# Patient Record
Sex: FEMALE | Race: WHITE | NOT HISPANIC OR LATINO | Employment: UNEMPLOYED | URBAN - METROPOLITAN AREA
[De-identification: names, ages, dates, MRNs, and addresses within clinical notes are randomized per-mention and may not be internally consistent; named-entity substitution may affect disease eponyms.]

---

## 2021-02-03 ENCOUNTER — OFFICE VISIT CONVERTED (OUTPATIENT)
Dept: UROLOGY | Facility: CLINIC | Age: 36
End: 2021-02-03
Attending: NURSE PRACTITIONER

## 2021-02-03 ENCOUNTER — HOSPITAL ENCOUNTER (OUTPATIENT)
Dept: SURGERY | Facility: CLINIC | Age: 36
Discharge: HOME | End: 2021-02-03
Attending: NURSE PRACTITIONER

## 2021-02-06 LAB
AMPICILLIN SUSC ISLT: <=0.25
BACTERIA UR CULT: ABNORMAL
CEFOTAXIME SUSC ISLT: <=0.12
CEFTRIAXONE SUSC ISLT: <=0.12
LEVOFLOXACIN SUSC ISLT: 0.5
PENICILLIN G SUSC ISLT: <=0.06
TETRACYCLINE SUSC ISLT: >=16
TIGECYCLINE SUSC ISLT: <=0.06
VANCOMYCIN SUSC ISLT: 0.5

## 2021-03-01 ENCOUNTER — HOSPITAL ENCOUNTER (OUTPATIENT)
Dept: OTHER | Facility: HOSPITAL | Age: 36
Discharge: HOME | End: 2021-03-01
Attending: NURSE PRACTITIONER

## 2021-03-01 LAB
APPEARANCE UR: ABNORMAL
BILIRUB UR QL: NEGATIVE
COLOR UR: YELLOW
CONV BACTERIA: NEGATIVE
CONV COLLECTION SOURCE (UA): ABNORMAL
CONV HYALINE CASTS IN URINE MICRO: ABNORMAL /[LPF]
CONV UROBILINOGEN IN URINE BY AUTOMATED TEST STRIP: 0.2 {EHRLICHU}/DL (ref 0.1–1)
GLUCOSE UR QL: NEGATIVE MG/DL
HGB UR QL STRIP: NEGATIVE
KETONES UR QL STRIP: NEGATIVE MG/DL
LEUKOCYTE ESTERASE UR QL STRIP: NEGATIVE
NITRITE UR QL STRIP: NEGATIVE
PH UR STRIP.AUTO: 5 [PH] (ref 5–8)
PROT UR QL: ABNORMAL MG/DL
RBC #/AREA URNS HPF: ABNORMAL /[HPF]
SP GR UR: 1.03 (ref 1–1.03)
WBC #/AREA URNS HPF: ABNORMAL /[HPF]

## 2021-03-03 LAB — BACTERIA UR CULT: NORMAL

## 2021-05-10 NOTE — H&P
History and Physical      Patient Name: Elizabet Cooley   Patient ID: [id number]   Sex: Female   YOB: 1985    Primary Care Provider: Manan ESTRADA   Referring Provider: Nemo DIEHL    Visit Date: February 3, 2021    Provider: NATALIE Shaffer   Location: [facility] [place]   Location Address: [address] [place] [address]  [address] [place] [address]  [id number]   Location Phone: [phone]          Chief Complaint  · Urological concerns      History Of Present Illness  The patient is a 35 year old /White female, who is a consultation from Nemo DIEHL, for a persistent UTI.   Symptoms are frequency, urgency, and abdominal pain. She is moderately bothered by the symptoms. The following additional symptoms are noted: back pain, stomach pain. There are no aggravating factors. The patient is not constipated.   She has had prior treatment. Temporary relief has been obtained with antibiotics. The most recent antibiotic was taken 1 month ago. Prior evaluation has been done. This includes CT scan and the studies were normal.   She has no significant prior  problems.      The patient was seen at an urgent care center in September 2020 and states that she was diagnosed at that point in time with bacterial vaginosis as well urinary tract infection.    Patient states that she was given Augmentin and Flagyl for treatment of her urinary tract infection as well was bacterial vaginosis.    Per her OB/GYN's record the patient has had a positive culture for group B strep on 9/1/2020, 10/8/2020 and 10/29/2020 review of these urine cultures revealed that on 9/1/2020 the patient had beta-hemolytic Streptococcus group B greater than 100,000 colony-forming units per milliliter.  She was treated with an antibiotic at that point in time and then presented to her OB/GYN and placed on cefdinir twice daily x10 days for a urine culture that was positive for 1000  "colonies per milliliter of beta-hemolytic Streptococcus group B.  After that point in time the patient reports that she has had colitis which resulted in her having blood in her stools and abdominal pain.  She presented to her primary care provider on 10/29/2020 and her urine culture was again positive for beta-hemolytic Streptococcus 1000 colonies per milliliter for which her primary care provider did not treat.    Per the patient's OB/GYN notes she does have frequent issues with vulvovaginitis.    Primary care provider ordered a CT abdomen and wrist with IV which revealed no acute findings    The patient states that in October prior to presenting to her primary care provider's office she a \"wave of pain that felt like contractions \"her urine culture came back negative after this time the patient was not treated for an acute infection    large blood on urine dip today however she does report being on her menses    The patient states that she does not have penetrative sexual intercourse however the patient and her partner often use their hands for vaginal stimulation.  The patient also states that her partner uses oral fluids as vaginal lubricant during this point in time.    patient drinks 1-2 bottles of water a day       Past Medical History  Allergic rhinitis, chronic; Anemia, Unspecified; Colitis; GERD; Proctitis; Rectal bleeding         Past Surgical History  Cesarian Section; Colonoscopy         Medication List  Acidophilus oral capsule; folic acid 1 mg oral tablet; Probiotic 15 billion cell oral capsule         Allergy List  erythromycin; SULFA (SULFONAMIDES)       Allergies Reconciled  Family Medical History  Diabetes, unspecified type; Family history of colon cancer         Social History  Alcohol (Current some day); Caffeine (Current some day); Illicit drug use (Never); Tobacco (Former)         Review of Systems  · Constitutional  o Denies  o : fever, chills  · Cardiovascular  o Denies  o : chest pain at " "rest, chest pain with exercise, irregular heart beats, palpitations, leg cramps with exercise  · Respiratory  o Denies  o : shortness of breath, wheezing, sleep apnea  · Gastrointestinal  o Denies  o : heartburn or indigestion, nausea or vomiting, change in abdominal girth, diarrhea, constipation, blood in stools  · Genitourinary  o Admits  o : additional symptoms as noted in HPI  · Integument  o Denies  o : rash, new skin lesions  · Neurologic  o Denies  o : memory difficulties, headache, mini-strokes, seizures  · Endocrine  o Denies  o : hot flashes, thyroid disorders  · Heme-Lymph  o Denies  o : easy bleeding, easy bruising, sickle cell disease or trait, lymph node enlargement or tenderness  · Allergic-Immunologic  o Denies  o : immune deficiency, HIV, Hepatitis C      Vitals  Date Time BP Position Site L\R Cuff Size HR RR TEMP (F) WT  HT  BMI kg/m2 BSA m2 O2 Sat FR L/min FiO2 HC       02/03/2021 08:49 AM       16 97.4 184lbs 2oz 5'  5\" 30.64 1.96             Physical Examination  · Constitutional  o Appearance  o : Well nourished, well developed patient in no acute distress. Ambulating without difficulty.  · Head and Face  o Head  o :   § Inspection  § : atraumatic, normocephalic  o Face  o :   § Inspection  § : no facial lesions  · Eyes  o Sclerae  o : sclerae white  · Ears, Nose, Mouth and Throat  o Ears  o :   § External Ears  § : appearance within normal limits, no lesions present  o Nose  o :   § External Nose  § : appearance normal  · Neck  o Inspection/Palpation  o : normal appearance, trachea midline  · Respiratory  o Respiratory Effort  o : Breathing is unlabored without accessory muscle use  o Inspection of Chest  o : normal appearance, no retractions  · Musculoskeletal  o Pelvis  o : no pelvic bony or muscular tenderness  o Ribs  o : no deformities or tenderness to palpation present, costochondral junctions nontender to palpation  · Skin and Subcutaneous Tissue  o General Inspection  o : No rashes, " lesions or areas of discoloration present. Skin turgor is normal.  · Neurologic  o Mental Status Examination  o :   § Orientation  § : grossly oriented to person, place and time  § Speech/Language  § : communication ability within normal limits  o Gait and Station  o : normal gait, able to stand without difficulty  · Psychiatric  o Judgement and Insight  o : judgment and insight intact, judgement for everyday activities and social situations within normal limits, insight intact  o Mood and Affect  o : mood normal, affect appropriate          Results  · In-Office Procedures  o Surgical procedure  § IOP - Bladder Scan/Residual Urine (05889)   § Specimen vol Ur: 000   o Lab procedure  § Automated dipstick urinalysis with microscopy (06472)   § Color Ur: Yellow   § Clarity Ur: Clear   § Glucose Ur Ql Strip: Negative   § Bilirub Ur Ql Strip: Negative   § Ketones Ur Ql Strip: Negative   § Sp Gr Ur Qn: greater than 1.030   § Hgb Ur Ql Strip: Large   § pH Ur-LsCnc: 5.5   § Prot Ur Ql Strip: Negative   § Urobilinogen Ur Strip-mCnc: 0.2 E.U./dL   § Nitrite Ur Ql Strip: Negative   § WBC Est Ur Ql Strip: Negative   § RBC UrnS Qn HPF: 0-3   § WBC UrnS Qn HPF: 0   § Bacteria UrnS Qn HPF: few   § Crystals UrnS Qn HPF: 0   § Epithelial Cells (non renal): 0 /HPF  § Epithelial Cells (renal): 0       Assessment  · Cystitis     595.9/N30.90  · Suprapubic pain     789.09/R10.2      Plan  · Orders  o Urine Culture (Clean Catch) [facility] ([id number]) - 595.9/N30.90 - 02/03/2021  · Medications  o Medications have been Reconciled  o Transition of Care or Provider Policy  · Instructions  o DISCUSSION: Discussed with patient that I do not believe that her symptoms are urinary in etiology. Also discussed with patient that it does appear that her OB/GYN has been treating her for subclinical urinary tract infections and that she may have colonized the beta group be strep in her urinary system. I do not feel as if the patient warranted further antibiotic  treatment for her urine cultures after the initial treatment. Discussed with patient the AUA guidelines for treatment of urinary tract infections and that given her urine cultures have less than 50,000 colony-forming units per milliliter and her urinalysis does not reveal pyuria she did not require treatment. I did discuss with the patient that I believe her abdominal pains are GI in nature especially given her history of colitis and negative urologic findings on CT scan. After review of the patient's chart leave that her burning with urination more than likely is related to vulvovaginitis and not a urologic issue.  o PLAN: Discussed with the patient that she will need to follow-up with me solely diagnosis and treatment of her further urinary tract infections as it does not appear to be benefiting her to bounce back and forth from her OB/GYN to her primary care provider this issue. Patient is to call the office with any signs or symptoms of urinary tract infection. We will follow-up with her in office in 3 months  o Urine culture  o Void after intercourse and wipe front to back when using the restroom  o Avoid bladder irritants  o Timed voiding q 3-4 hours  o Double voiding  o Avoid tub baths, tampons, spermicides, vaginal douching, IUD use  o Encourage intake of yogurt and cranberry tablets/juice  o The patient is advised to call for any signs or symptoms of a UTI.  o Electronically Identified Patient Education Materials Provided Electronically            Electronically Signed by: NATALIE Shaffer -Author on February 3, 2021 10:15:48 PM

## 2021-05-14 VITALS — BODY MASS INDEX: 30.68 KG/M2 | HEIGHT: 65 IN | WEIGHT: 184.12 LBS | RESPIRATION RATE: 16 BRPM | TEMPERATURE: 97.4 F

## 2021-05-24 ENCOUNTER — OFFICE VISIT CONVERTED (OUTPATIENT)
Dept: UROLOGY | Facility: CLINIC | Age: 36
End: 2021-05-24
Attending: NURSE PRACTITIONER

## 2021-05-24 ENCOUNTER — CONVERSION ENCOUNTER (OUTPATIENT)
Dept: SURGERY | Facility: CLINIC | Age: 36
End: 2021-05-24

## 2021-05-24 ENCOUNTER — HOSPITAL ENCOUNTER (OUTPATIENT)
Dept: SURGERY | Facility: CLINIC | Age: 36
Discharge: HOME | End: 2021-05-24
Attending: NURSE PRACTITIONER

## 2021-05-24 LAB
BILIRUB UR QL STRIP: NEGATIVE
COLOR UR: YELLOW
CONV BACTERIA IN URINE MICRO: NORMAL
CONV CALCIUM OXALATE CRYSTALS /HPF IN URINE SEDIMENT BY MICROSCOPY: 0
CONV CLARITY OF URINE: CLEAR
CONV PROTEIN IN URINE BY AUTOMATED TEST STRIP: NEGATIVE
CONV UROBILINOGEN IN URINE BY AUTOMATED TEST STRIP: NORMAL
GLUCOSE UR QL: NEGATIVE
HGB UR QL STRIP: NEGATIVE
KETONES UR QL STRIP: NEGATIVE
LEUKOCYTE ESTERASE UR QL STRIP: NEGATIVE
NITRITE UR QL STRIP: NEGATIVE
PH UR STRIP.AUTO: 5.5 [PH]
RBC #/AREA URNS HPF: 0 /[HPF]
RENAL EPI CELLS #/AREA URNS HPF: 0 /[HPF]
SP GR UR: 1.03
SQUAMOUS SPT QL MICRO: NORMAL
WBC #/AREA URNS HPF: 0 /[HPF]

## 2021-05-26 LAB — BACTERIA UR CULT: NORMAL

## 2021-06-05 NOTE — PROGRESS NOTES
Progress Note      Patient Name: Elizabet Cooley   Patient ID: [id number]   Sex: Female   YOB: 1985    Primary Care Provider: Manan ESTRADA   Referring Provider: Nemo DIEHL    Visit Date: May 24, 2021    Provider: NATALIE Shaffer   Location: [facility] [place]   Location Address: [address] [place] [address]  [address] [place] [address]  [id number]   Location Phone: [phone]          Chief Complaint  · pt here for urological concerns  · Urological concerns      History Of Present Illness  The patient is a 35 year old /White female , returns for f/u on recurrent UTI.        States that she has not had a urinary tract infection since she saw us last however she states that she does feel like she might be becoming symptomatic today given her increased urgency and frequency and some mild suprapubic pain.    Her urine dip is unremarkable in office today however she does have a significant amount of bacteria on urine microscopy.    she reports that she is increasing her water intake but doing so she states that she is having issues with increased urgency and frequency as well as urinary leakage.      Previous:  The patient was seen at an urgent care center in September 2020 and states that she was diagnosed at that point in time with bacterial vaginosis as well urinary tract infection.    Patient states that she was given Augmentin and Flagyl for treatment of her urinary tract infection as well was bacterial vaginosis.    Per her OB/GYN's record the patient has had a positive culture for group B strep on 9/1/2020, 10/8/2020 and 10/29/2020 review of these urine cultures revealed that on 9/1/2020 the patient had beta-hemolytic Streptococcus group B greater than 100,000 colony-forming units per milliliter.  She was treated with an antibiotic at that point in time and then presented to her OB/GYN and placed on cefdinir twice daily x10 days for a urine  "culture that was positive for 1000 colonies per milliliter of beta-hemolytic Streptococcus group B.  After that point in time the patient reports that she has had colitis which resulted in her having blood in her stools and abdominal pain.  She presented to her primary care provider on 10/29/2020 and her urine culture was again positive for beta-hemolytic Streptococcus 1000 colonies per milliliter for which her primary care provider did not treat.    Per the patient's OB/GYN notes she does have frequent issues with vulvovaginitis.    Primary care provider ordered a CT abdomen and wrist with IV which revealed no acute findings    The patient states that in October prior to presenting to her primary care provider's office she a \"wave of pain that felt like contractions \"her urine culture came back negative after this time the patient was not treated for an acute infection    large blood on urine dip today however she does report being on her menses    The patient states that she does not have penetrative sexual intercourse however the patient and her partner often use their hands for vaginal stimulation.  The patient also states that her partner uses oral fluids as vaginal lubricant during this point in time.    patient drinks 1-2 bottles of water a day       Past Medical History  Allergic rhinitis, chronic; Anemia, Unspecified; Colitis; GERD; Proctitis; Rectal bleeding         Past Surgical History  Cesarian Section; Colonoscopy         Medication List  Acidophilus oral capsule; folic acid 1 mg oral tablet         Allergy List  erythromycin; SULFA (SULFONAMIDES)       Allergies Reconciled  Family Medical History  Diabetes, unspecified type; Family history of colon cancer         Social History  Alcohol (Current some day); Caffeine (Current some day); Illicit drug use (Never); Tobacco (Former)         Review of Systems  · Constitutional  o Denies  o : fever, chills  · Cardiovascular  o Denies  o : chest pain at rest, " "chest pain with exercise, irregular heart beats, palpitations, leg cramps with exercise  · Respiratory  o Denies  o : shortness of breath, wheezing, sleep apnea  · Gastrointestinal  o Denies  o : heartburn or indigestion, nausea or vomiting, change in abdominal girth, diarrhea, constipation, blood in stools  · Genitourinary  o Admits  o : additional symptoms as noted in HPI  · Integument  o Denies  o : rash, new skin lesions  · Neurologic  o Denies  o : memory difficulties, headache, mini-strokes, seizures  · Endocrine  o Denies  o : hot flashes, thyroid disorders  · Heme-Lymph  o Denies  o : easy bleeding, easy bruising, sickle cell disease or trait, lymph node enlargement or tenderness  · Allergic-Immunologic  o Denies  o : immune deficiency, HIV, Hepatitis C      Vitals  Date Time BP Position Site L\R Cuff Size HR RR TEMP (F) WT  HT  BMI kg/m2 BSA m2 O2 Sat FR L/min FiO2 HC       05/24/2021 12:00 PM       17  195lbs 4oz 5'  5\" 32.49 2.02             Physical Examination  · Constitutional  o Appearance  o : Well nourished, well developed patient in no acute distress. Ambulating without difficulty.  · Head and Face  o Head  o :   § Inspection  § : atraumatic, normocephalic  o Face  o :   § Inspection  § : no facial lesions  · Eyes  o Sclerae  o : sclerae white  · Ears, Nose, Mouth and Throat  o Ears  o :   § External Ears  § : appearance within normal limits, no lesions present  o Nose  o :   § External Nose  § : appearance normal  · Neck  o Inspection/Palpation  o : normal appearance, trachea midline  · Respiratory  o Respiratory Effort  o : Breathing is unlabored without accessory muscle use  o Inspection of Chest  o : normal appearance, no retractions  · Musculoskeletal  o Pelvis  o : no pelvic bony or muscular tenderness  o Ribs  o : no deformities or tenderness to palpation present, costochondral junctions nontender to palpation  · Skin and Subcutaneous Tissue  o General Inspection  o : No rashes, lesions or " areas of discoloration present. Skin turgor is normal.  · Neurologic  o Mental Status Examination  o :   § Orientation  § : grossly oriented to person, place and time  § Speech/Language  § : communication ability within normal limits  o Gait and Station  o : normal gait, able to stand without difficulty  · Psychiatric  o Judgement and Insight  o : judgment and insight intact, judgement for everyday activities and social situations within normal limits, insight intact  o Mood and Affect  o : mood normal, affect appropriate          Results  · In-Office Procedures  o Lab procedure  § Automated dipstick urinalysis with microscopy ([id number])   § Color Ur: Yellow   § Clarity Ur: Clear   § Glucose Ur Ql Strip: Negative   § Bilirub Ur Ql Strip: Negative   § Ketones Ur Ql Strip: Negative   § Sp Gr Ur Qn: 1.030   § Hgb Ur Ql Strip: Negative   § pH Ur-LsCnc: 5.5   § Prot Ur Ql Strip: Negative   § Urobilinogen Ur Strip-mCnc: 0.2 E.U./dL   § Nitrite Ur Ql Strip: Negative   § WBC Est Ur Ql Strip: Negative   § RBC UrnS Qn HPF: 0   § WBC UrnS Qn HPF: 0   § Bacteria UrnS Qn HPF: tntc   § Crystals UrnS Qn HPF: 0   § Epithelial Cells (non renal): 1-2 /HPF  § Epithelial Cells (renal): 0       Assessment  · Cystitis     595.9/N30.90  · Suprapubic pain     789.09/R10.2      Plan  · Orders  o Urine Culture (Clean Catch) [facility] ([id number]) - 595.9/N30.90, 789.09/R10.2 - 05/24/2021  · Medications  o Medications have been Reconciled  o Transition of Care or Provider Policy  · Instructions  o DISCUSSION: Discussed with the patient that when increasing oral fluid intake it can take up to 4 to 6 weeks for your body to adjust to a new level of hydration and resolve urgency and frequency associated with that. Discussed options for treatment including short-term anticholinergic medication however given the fact the patient is already constipated she would like to forego any medication for this at this point in time. We discussed double voiding as well as  timed voiding and Kegel exercises to help alleviate her urinary urgency and frequency as well as urinary leakage. She was provided with a bladder matters book.   o PLAN: We will send patient's urine for culture today and call her with those results. We will follow-up with her in 4 months or sooner if needed. Discussed with the patient that should she wish to trial anticholinergic medication she will call the office and we will send this in.  o Urine culture  o Void after intercourse and wipe front to back when using the restroom  o Avoid bladder irritants  o Timed voiding q 3-4 hours  o Double voiding  o Avoid tub baths, tampons, spermicides, vaginal douching, IUD use  o Encourage intake of yogurt and cranberry tablets/juice  o The patient is advised to call for any signs or symptoms of a UTI.  o Electronically Identified Patient Education Materials Provided Electronically            Electronically Signed by: NATALIE Shaffer -Author on May 24, 2021 01:09:58 PM

## 2021-07-15 VITALS — RESPIRATION RATE: 17 BRPM | HEIGHT: 65 IN | BODY MASS INDEX: 32.53 KG/M2 | WEIGHT: 195.25 LBS

## 2021-09-27 ENCOUNTER — TELEPHONE (OUTPATIENT)
Dept: SURGERY | Facility: CLINIC | Age: 36
End: 2021-09-27

## 2021-09-27 ENCOUNTER — OFFICE VISIT (OUTPATIENT)
Dept: UROLOGY | Facility: CLINIC | Age: 36
End: 2021-09-27

## 2021-09-27 VITALS — WEIGHT: 194.4 LBS | RESPIRATION RATE: 12 BRPM | HEIGHT: 65 IN | BODY MASS INDEX: 32.39 KG/M2

## 2021-09-27 DIAGNOSIS — R35.0 FREQUENCY OF URINATION: Primary | ICD-10-CM

## 2021-09-27 PROBLEM — K62.89 PROCTITIS: Status: ACTIVE | Noted: 2021-09-27

## 2021-09-27 PROBLEM — D64.9 ANEMIA: Status: ACTIVE | Noted: 2021-09-27

## 2021-09-27 PROBLEM — K21.9 ESOPHAGEAL REFLUX: Status: ACTIVE | Noted: 2021-09-27

## 2021-09-27 PROBLEM — J30.9 ALLERGIC RHINITIS: Status: ACTIVE | Noted: 2021-09-27

## 2021-09-27 PROBLEM — K52.3 INDETERMINATE COLITIS: Status: ACTIVE | Noted: 2021-09-27

## 2021-09-27 LAB
BILIRUB BLD-MCNC: NEGATIVE MG/DL
CLARITY, POC: CLEAR
COLOR UR: YELLOW
GLUCOSE UR STRIP-MCNC: NEGATIVE MG/DL
KETONES UR QL: NEGATIVE
LEUKOCYTE EST, POC: NEGATIVE
NITRITE UR-MCNC: NEGATIVE MG/ML
PH UR: 5 [PH] (ref 5–8)
PROT UR STRIP-MCNC: NEGATIVE MG/DL
RBC # UR STRIP: NEGATIVE /UL
SP GR UR: 1.03 (ref 1–1.03)
UROBILINOGEN UR QL: NORMAL

## 2021-09-27 PROCEDURE — 99212 OFFICE O/P EST SF 10 MIN: CPT | Performed by: NURSE PRACTITIONER

## 2021-09-27 RX ORDER — CITALOPRAM 20 MG/1
20 TABLET ORAL DAILY
COMMUNITY
End: 2023-04-03

## 2021-09-27 RX ORDER — OMEPRAZOLE 40 MG/1
CAPSULE, DELAYED RELEASE ORAL
COMMUNITY
End: 2023-04-03

## 2021-09-27 RX ORDER — FOLIC ACID 1 MG/1
TABLET ORAL
COMMUNITY
Start: 2021-09-06 | End: 2023-04-03

## 2021-09-27 RX ORDER — SERTRALINE HYDROCHLORIDE 25 MG/1
TABLET, FILM COATED ORAL
COMMUNITY
Start: 2021-09-16 | End: 2023-04-03

## 2021-09-27 RX ORDER — NYSTATIN 100000 U/G
OINTMENT TOPICAL
COMMUNITY
Start: 2021-09-16 | End: 2023-04-03

## 2021-09-27 RX ORDER — IBUPROFEN 400 MG/1
TABLET ORAL
COMMUNITY

## 2021-09-27 RX ORDER — GLIMEPIRIDE 2 MG/1
TABLET ORAL
COMMUNITY
Start: 2021-06-29 | End: 2023-04-03

## 2021-09-27 RX ORDER — LACTOBACILLUS ACIDOPHILUS 20B CELL
1 CAPSULE ORAL DAILY
Qty: 90 CAPSULE | Refills: 3 | Status: SHIPPED | OUTPATIENT
Start: 2021-09-27 | End: 2023-04-03

## 2021-09-27 RX ORDER — FLUCONAZOLE 100 MG/1
TABLET ORAL
COMMUNITY
Start: 2021-07-07

## 2021-09-27 NOTE — TELEPHONE ENCOUNTER
I am not sure who recommended a probiotic for her.  I can attempt to send it in but most of the time insurance companies will not cover that. We can try alcides [facility]dy and we can place a referral and give her that info.

## 2021-09-27 NOTE — PROGRESS NOTES
"Chief Complaint: Cystitis (Pt here is for follow up.  She is doing worse.  Says she has decreased fluied intake, bcause has to urinate immeditaley)    Subjective         History of Present Illness  Elizabet Cooley is a 36 y.o. female presents to [facility] to be seen for follow-up cystitis.    Patient states that she has decreased her fluid intake due to the \"urge to urinate immediately\" after drinking.    Patient states she feels as if \"she is dehydrating herself\". She has been drinking less and states the Kegel exercises have failed to help her leakage.    We had discussed at last visit the possibility of utilizing oxybutynin to gain better control of her urinary urgency and frequency however patient did not wish to go forward with that treatment modality given that she was with constipation issues already.    She states as if she has lower back pain that has been traveling up to her mid back and shoulders and she believed she had a UTI.         Objective     Past Medical History:   Diagnosis Date   • Anemia, unspecified    • Chronic allergic rhinitis    • Colitis    • GERD (gastroesophageal reflux disease)    • Proctitis    • Rectal bleeding        Past Surgical History:   Procedure Laterality Date   •  SECTION     • COLONOSCOPY           Current Outpatient Medications:   •  fluconazole (DIFLUCAN) 100 MG tablet, , Disp: , Rfl:   •  folic acid (FOLVITE) 1 MG tablet, , Disp: , Rfl:   •  ibuprofen (ADVIL,MOTRIN) 400 MG tablet, , Disp: , Rfl:   •  citalopram (CeleXA) 20 MG tablet, Take 20 mg by mouth Daily., Disp: , Rfl:   •  Lactobacillus (ACIDOPHILUS PO), Acidophilus oral capsule take 1 capsule by oral route daily   Active, Disp: , Rfl:   •  nystatin (MYCOSTATIN) 989898 UNIT/GM ointment, , Disp: , Rfl:   •  omeprazole (priLOSEC) 40 MG capsule, , Disp: , Rfl:   •  sertraline (ZOLOFT) 25 MG tablet, , Disp: , Rfl:   •  timolol (TIMOPTIC) 0.25 % ophthalmic solution, , Disp: , " "Rfl:     Allergies   Allergen Reactions   • Erythromycin Unknown - Low Severity   • Sulfa Antibiotics Unknown - Low Severity        Family History   Problem Relation Age of Onset   • Colon cancer Mother         40s   • Diabetes Father    • Diabetes Sister    • Diabetes Maternal Grandmother    • Diabetes Paternal Grandfather    • Diabetes Paternal Aunt    • Diabetes Paternal Uncle        Social History     Socioeconomic History   • Marital status: Single     Spouse name: Not on file   • Number of children: Not on file   • Years of education: Not on file   • Highest education level: Not on file   Tobacco Use   • Smoking status: Former Smoker     Start date: 2001   • Smokeless tobacco: Never Used   Substance and Sexual Activity   • Alcohol use: Yes     Comment: drinks rarely   • Drug use: Never       Vital Signs:   Resp 12   Ht 165.1 cm (65\")   Wt 88.2 kg (194 lb 6.4 oz)   BMI 32.35 kg/m²      Physical Exam     Result Review :   The following data was reviewed by: NATALIE Malloy on 09/27/2021:  Results for orders placed or performed in visit on 09/27/21   POC Urinalysis Dipstick, Automated    Specimen: Urine   Result Value Ref Range    Color Yellow Yellow, Straw, Dark Yellow, Estee    Clarity, UA Clear Clear    Specific Gravity  1.030 1.005 - 1.030    pH, Urine 5.0 5.0 - 8.0    Leukocytes Negative Negative    Nitrite, UA Negative Negative    Protein, POC Negative Negative mg/dL    Glucose, UA Negative Negative, 1000 mg/dL (3+) mg/dL    Ketones, UA Negative Negative    Urobilinogen, UA Normal Normal    Bilirubin Negative Negative    Blood, UA Negative Negative            Procedures        Assessment and Plan    Diagnoses and all orders for this visit:    1. Frequency of urination (Primary)  -     POC Urinalysis Dipstick, Automated  -     Ambulatory Referral to Physical Therapy Pelvic Floor    Discussed with patient options for treatment including medication therapy or pelvic floor physical therapy.  Patient " "states that she is not interested in medication as she would \"probably not take it\" as she is fearful to start new medicines.  Patient ultimately would like to trial pelvic floor physical therapy and see if this helps with her urinary leakage as well as urinary urgency and frequency.      I spent 10 minutes caring for Elizabet on this date of service. This time includes time spent by me in the following activities:reviewing tests, obtaining and/or reviewing a separately obtained history, performing a medically appropriate examination and/or evaluation , counseling and educating the patient/family/caregiver, ordering medications, tests, or procedures, and documenting information in the medical record  Follow Up   Return in about 4 months (around 2/9/2022) for f/u pelvic PT .  Patient was given instructions and counseling regarding her condition or for health maintenance advice. Please see specific information pulled into the AVS if appropriate.         This document has been electronically signed by NATALIE Malloy  September 27, 2021 11:17 EDT       "

## 2021-09-27 NOTE — TELEPHONE ENCOUNTER
Patient called and said that the place she was referred to does not take passport. Wants to know if there is another place in [place] to go to? Can you send in a probiotic to pt pharmacy? She cannot afford it OTC and wants to see if her insurance will pay for it. [facility] in [place]. CB# [phone]

## 2021-09-28 NOTE — TELEPHONE ENCOUNTER
Spoke to pt.  Explained in detail the message from [facility].  Pt doesn't want to see Romana Gill.  Pt will keep appt in January.

## 2021-10-27 ENCOUNTER — TELEPHONE (OUTPATIENT)
Dept: UROLOGY | Facility: CLINIC | Age: 36
End: 2021-10-27

## 2021-12-06 DIAGNOSIS — R35.0 FREQUENCY OF URINATION: Primary | ICD-10-CM

## 2022-01-03 ENCOUNTER — TELEPHONE (OUTPATIENT)
Dept: UROLOGY | Facility: CLINIC | Age: 37
End: 2022-01-03

## 2022-01-03 NOTE — TELEPHONE ENCOUNTER
Patient has been failed to reach x4 calls and no voicemail set up for patient to leave messages for her to call back. Certified letter has been sent on 12/17/2021. Patient has not called back or responded to letter. Will cancel out order for UC and will reorder if patient contacts us stating she feels as though she needs this.

## 2022-03-08 ENCOUNTER — TELEPHONE (OUTPATIENT)
Dept: UROLOGY | Facility: CLINIC | Age: 37
End: 2022-03-08

## 2022-03-08 DIAGNOSIS — R35.0 FREQUENCY OF URINATION: Primary | ICD-10-CM

## 2022-03-08 NOTE — TELEPHONE ENCOUNTER
Patient said Chelsey put in a referral for her last year for pelvic floor rehab.  [facility] in [place] wasn't taking her insurance at that time, but they are now, and she would like to go there.  Is her referral still good?

## 2022-03-09 NOTE — TELEPHONE ENCOUNTER
Called spoke with pt and she stated understanding. Also fax new referral to [facility]. Also rescheduled her apt for 3/28/22 for 4/11/22 at 130

## 2022-04-11 ENCOUNTER — TELEPHONE (OUTPATIENT)
Dept: UROLOGY | Facility: CLINIC | Age: 37
End: 2022-04-11

## 2023-03-30 ENCOUNTER — TELEPHONE (OUTPATIENT)
Dept: UROLOGY | Facility: CLINIC | Age: 38
End: 2023-03-30
Payer: COMMERCIAL

## 2023-03-30 NOTE — TELEPHONE ENCOUNTER
----- Message from Renuka Boucher sent at 3/29/2023 12:52 PM EDT -----  Originally [facility] patient, can we move her back to [facility] please

## 2023-04-02 PROBLEM — R39.15 URGENCY OF URINATION: Status: ACTIVE | Noted: 2023-04-02

## 2023-04-02 NOTE — PROGRESS NOTES
Chief Complaint: Urologic complaint    Subjective         History of Present Illness  Elizabet Cooley is a 37 y.o. female         Urgency incontinence      Patient's had urgent continence for the last few years.  5 pads daily.    Minimal stress incontinence    No GH      NP -       Urgency causing her decreased fluid intake.  Offered oxybutynin but did not go forward as she already had trouble with constipation.  UA negative  Offered pelvic floor physical therapy    History of recurrent UTI with resistant culture    No caffeine.    Urine culture     negative  3/21 negative    Possible h/o of glaucoma.      History of bacterial vaginosis     History of reflux as a child    No history of kidney  stone.    No urologic family history   no history of urologic surgery.    No cardiopulmonary history   Non-smoker.  No anticoagulation    States her eye doctor is worried about the possibility of autoimmune disorder      Results for orders placed or performed in visit on 23   POC Urinalysis Dipstick, Automated    Specimen: Urine   Result Value Ref Range    Color Yellow Yellow, Straw, Dark Yellow, Estee    Clarity, UA Clear Clear    Specific Gravity  1.030 1.005 - 1.030    pH, Urine 6.0 5.0 - 8.0    Leukocytes Negative Negative    Nitrite, UA Negative Negative    Protein, POC 2+ (A) Negative mg/dL    Glucose, UA Negative Negative mg/dL    Ketones, UA Negative Negative    Urobilinogen, UA Normal Normal, 0.2 E.U./dL    Bilirubin Negative Negative    Blood, UA Negative Negative    Lot Number [id number]     Expiration Date             Objective     Past Medical History:   Diagnosis Date   • Anemia, unspecified    • Chronic allergic rhinitis    • Colitis    • GERD (gastroesophageal reflux disease)    • Proctitis    • Rectal bleeding        Past Surgical History:   Procedure Laterality Date   •  SECTION     • COLONOSCOPY           Current Outpatient Medications:   •  citalopram (CeleXA) 20 MG tablet,  Take 20 mg by mouth Daily., Disp: , Rfl:   •  fluconazole (DIFLUCAN) 100 MG tablet, , Disp: , Rfl:   •  folic acid (FOLVITE) 1 MG tablet, , Disp: , Rfl:   •  ibuprofen (ADVIL,MOTRIN) 400 MG tablet, , Disp: , Rfl:   •  Lactobacillus (ACIDOPHILUS PO), Acidophilus oral capsule take 1 capsule by oral route daily   Active, Disp: , Rfl:   •  Lactobacillus (Florajen Acidophilus) capsule, Take 1 capsule by mouth Daily., Disp: 90 capsule, Rfl: 3  •  nystatin (MYCOSTATIN) 089500 UNIT/GM ointment, , Disp: , Rfl:   •  omeprazole (priLOSEC) 40 MG capsule, , Disp: , Rfl:   •  sertraline (ZOLOFT) 25 MG tablet, , Disp: , Rfl:   •  timolol (TIMOPTIC) 0.25 % ophthalmic solution, , Disp: , Rfl:     Allergies   Allergen Reactions   • Erythromycin Unknown - Low Severity   • Sulfa Antibiotics Unknown - Low Severity        Family History   Problem Relation Age of Onset   • Colon cancer Mother         40s   • Diabetes Father    • Diabetes Sister    • Diabetes Maternal Grandmother    • Diabetes Paternal Grandfather    • Diabetes Paternal Aunt    • Diabetes Paternal Uncle        Social History     Socioeconomic History   • Marital status: Single   Tobacco Use   • Smoking status: Former     Types: Cigarettes     Start date: 2001   • Smokeless tobacco: Never   Substance and Sexual Activity   • Alcohol use: Yes     Comment: drinks rarely   • Drug use: Never       Vital Signs:   There were no vitals taken for this visit.     Physical exam    Alert and orient x3  Well appearing, well developed, in no acute distress   Unlabored respirations  Nontender/nondistended  Grossly oriented to person, place and time, judgment is intact, normal mood and affect    Results for orders placed or performed in visit on 09/27/21   POC Urinalysis Dipstick, Automated    Specimen: Urine   Result Value Ref Range    Color Yellow Yellow, Straw, Dark Yellow, Estee    Clarity, UA Clear Clear    Specific Gravity  1.030 1.005 - 1.030    pH, Urine 5.0 5.0 - 8.0    Leukocytes  Negative Negative    Nitrite, UA Negative Negative    Protein, POC Negative Negative mg/dL    Glucose, UA Negative Negative, 1000 mg/dL (3+) mg/dL    Ketones, UA Negative Negative    Urobilinogen, UA Normal Normal    Bilirubin Negative Negative    Blood, UA Negative Negative             Assessment and Plan    Diagnoses and all orders for this visit:    1. Urgency of urination (Primary)        Records reviewed today and summarized in the chart      BMP      We did discuss placing patient on anticholinergic  -discussed because her eye doctor has told her she has had increased pressures I am worried about this until she discusses this with them.    After discussion she does not want medication.  Patient states she is not interested in taking medication for this problem.    We also discussed the possibility of Botox as she is not interested in oral medication.  She is also not interested in procedures     I did recommend cystoscopy with pelvic exam to rule out underlying pathology.  We did discuss failure to do this could risk missing a urologic malignancy which could be detrimental health or cause death,at this time patient not interested she understands risk and accepts this risk.      After discussion she would like to try pelvic physical therapy      Follow-up with nurse practitioner in a few months

## 2023-04-03 ENCOUNTER — LAB (OUTPATIENT)
Dept: LAB | Facility: HOSPITAL | Age: 38
End: 2023-04-03
Payer: COMMERCIAL

## 2023-04-03 ENCOUNTER — OFFICE VISIT (OUTPATIENT)
Dept: UROLOGY | Facility: CLINIC | Age: 38
End: 2023-04-03
Payer: COMMERCIAL

## 2023-04-03 VITALS — RESPIRATION RATE: 17 BRPM | WEIGHT: 166.2 LBS | BODY MASS INDEX: 27.66 KG/M2

## 2023-04-03 DIAGNOSIS — R39.15 URGENCY OF URINATION: Primary | ICD-10-CM

## 2023-04-03 DIAGNOSIS — R39.15 URGENCY OF URINATION: ICD-10-CM

## 2023-04-03 LAB
BILIRUB BLD-MCNC: NEGATIVE MG/DL
CLARITY, POC: CLEAR
COLOR UR: YELLOW
EXPIRATION DATE: ABNORMAL
GLUCOSE UR STRIP-MCNC: NEGATIVE MG/DL
KETONES UR QL: NEGATIVE
LEUKOCYTE EST, POC: NEGATIVE
Lab: ABNORMAL
NITRITE UR-MCNC: NEGATIVE MG/ML
PH UR: 6 [PH] (ref 5–8)
PROT UR STRIP-MCNC: ABNORMAL MG/DL
RBC # UR STRIP: NEGATIVE /UL
SP GR UR: 1.03 (ref 1–1.03)
UROBILINOGEN UR QL: NORMAL

## 2023-04-03 PROCEDURE — 1160F RVW MEDS BY RX/DR IN RCRD: CPT | Performed by: UROLOGY

## 2023-04-03 PROCEDURE — 36415 COLL VENOUS BLD VENIPUNCTURE: CPT

## 2023-04-03 PROCEDURE — 80048 BASIC METABOLIC PNL TOTAL CA: CPT

## 2023-04-03 PROCEDURE — 99213 OFFICE O/P EST LOW 20 MIN: CPT | Performed by: UROLOGY

## 2023-04-03 PROCEDURE — 1159F MED LIST DOCD IN RCRD: CPT | Performed by: UROLOGY

## 2023-04-04 LAB
ANION GAP SERPL CALCULATED.3IONS-SCNC: 9.5 MMOL/L (ref 5–15)
BUN SERPL-MCNC: 16 MG/DL (ref 6–20)
BUN/CREAT SERPL: 18.4 (ref 7–25)
CALCIUM SPEC-SCNC: 9.8 MG/DL (ref 8.6–10.5)
CHLORIDE SERPL-SCNC: 106 MMOL/L (ref 98–107)
CO2 SERPL-SCNC: 25.5 MMOL/L (ref 22–29)
CREAT SERPL-MCNC: 0.87 MG/DL (ref 0.57–1)
EGFRCR SERPLBLD CKD-EPI 2021: 89.2 ML/MIN/1.73
GLUCOSE SERPL-MCNC: 89 MG/DL (ref 65–99)
POTASSIUM SERPL-SCNC: 4.1 MMOL/L (ref 3.5–5.2)
SODIUM SERPL-SCNC: 141 MMOL/L (ref 136–145)

## 2023-04-07 ENCOUNTER — TELEPHONE (OUTPATIENT)
Dept: UROLOGY | Facility: CLINIC | Age: 38
End: 2023-04-07
Payer: COMMERCIAL

## 2023-04-21 NOTE — TELEPHONE ENCOUNTER
I SCHEDULED PT APPT WITH [facility] IN [facility], PT WOULD LIKE FOR SOMEONE TO CALL HER ABOUT HER LAB RESULTS AND THE REFERRAL TO PHYSICAL THERAPY?

## 2023-08-23 ENCOUNTER — TELEPHONE (OUTPATIENT)
Dept: UROLOGY | Facility: CLINIC | Age: 38
End: 2023-08-23

## 2023-08-23 NOTE — TELEPHONE ENCOUNTER
CALLED PT TO RS NO SHOW APPT W/[facility]/PT ASKED IF SHE COULD CALL BACK BECAUSE SHE WAS IN THE MIDDLE OF MOVING

## 2023-09-13 NOTE — TELEPHONE ENCOUNTER
3RD CALL TO PT TO RS NO SHOW APPT ALINE/[facility]/NO ANSWER/NO VM SET UP/THIS IS A BELLE PT/ANYTHING ELSE TO DO??

## 2023-09-20 ENCOUNTER — TELEPHONE (OUTPATIENT)
Dept: UROLOGY | Facility: CLINIC | Age: 38
End: 2023-09-20
Payer: COMMERCIAL

## 2023-09-20 DIAGNOSIS — R30.0 DYSURIA: Primary | ICD-10-CM

## 2023-09-20 NOTE — TELEPHONE ENCOUNTER
Provider: [facility]MIER    Caller: KERRIE BRITT    Relationship to Patient: SELF     Pharmacy: [facility]    Phone Number: [phone] PT'S PHONE IS BROKEN, PLEASE CALL HER MOTHER'S PHONE [phone]    Reason for Call: PT BELIEVES SHE HAS A UTI AND IS IN SIGNIFICANT PAIN.    When was the patient last seen: 8/23/23    When did it start: 3-4 DAYS    Where is it located: PELVIC AREA    Characteristics of symptom/severity: BACK PAIN, WRAP AROUND PAIN (WRAPPING AROUND SIDE), PELVIC PRESSURE, PT IS IN TEARS OVER BACK PAIN    Timing- Is it constant or intermittent: BPTH    What makes it worse: NO    What makes it better: NO    What therapies/medications have you tried: NO

## 2023-09-20 NOTE — TELEPHONE ENCOUNTER
Spoke to at 11:30am by phone.  After consult with JANICE Baker about the staph it was recommend that she see her gynecologist.  However, I would place an order for her to have a UA with Culture done through [facility], due to her symptoms and descriptions.  Pt understood the recommendations. Orders have been placed.

## 2023-10-20 ENCOUNTER — TELEPHONE (OUTPATIENT)
Dept: UROLOGY | Facility: CLINIC | Age: 38
End: 2023-10-20
Payer: COMMERCIAL

## 2023-10-20 NOTE — TELEPHONE ENCOUNTER
Called pt phone number on chart and was unable to leave message on machine. Called pt's mom's number that was provided in previous telephone encounter and LMOM to return call regarding lab orders that are needing to be completed. DARYL RN

## 2023-10-31 ENCOUNTER — TELEPHONE (OUTPATIENT)
Dept: UROLOGY | Facility: CLINIC | Age: 38
End: 2023-10-31
Payer: COMMERCIAL

## 2023-10-31 NOTE — TELEPHONE ENCOUNTER
PT CALLED TO REPORT THAT SHE URINATED BLOOD ONE TIME. SINCE THEN HER URINE IS JUST DARK YELLOW-ORANGE. SHE WENT TO [facility] IN [place] AND IS WAITING FOR URINE CULTURE RESULTS. SHE IS CONCERNED THAT SHE MAY BE DEHYDRATED AND NEED LABS. STATES THAT SHE HAS SOME PAIN AND BURNING. SHE DOES NOT KNOW WHAT KIND OF WORKUP SHE NEEDS.

## 2023-10-31 NOTE — TELEPHONE ENCOUNTER
Caller: Elizabet Cooley    Relationship: Self    Best call back number: [phone] (home)     What is the best time to reach you: AFTERNOONS    Who are you requesting to speak with (clinical staff, provider,  specific staff member): [facility] OR STAFF    Do you know the name of the person who called: PAT CALLED OFFICE    What was the call regarding: PAT HAD LAB WORK DONE AT [facility] IN [place]. STATED THAT RESULTS CAME BACK WITH NO UTI. PAT WOULD LIKE TO GET BLOOD WORK DONE SHOULD SHE SEE THIS OFFICE, GYN, OR FAMILY DR TO GET ORDERS. HAS HAD ISSUES IN THE PAST WITH VAGINAL STREP AND IS CONCERNED ON WHAT TO DO. URINE IS DARK YELLOW AND BOTH SIDES HURT AND BURN. PLEASE CALL PAT BACK TO DISCUSS.    Is it okay if the provider responds through MyChart: NO

## 2023-10-31 NOTE — TELEPHONE ENCOUNTER
Called patient and spoke with the patient we got her results from the urgent care center she does not currently have a UTI.  The patient states she does not know if it is vaginal blood or not but when she urinates she can see tiny specks of blood in the urine and can also see blood when she wipes.  She is more concerned about the fact that her urine is dark yellow I recommended increasing oral hydration as most of the time this affects the color of the urine.    She does have an appointment with her PCP tomorrow I did recommend following up with them and possibly gynecology for further evaluation especially because the patient said that they told her at urgent care she did not have blood in her urine

## 2023-11-10 ENCOUNTER — TELEPHONE (OUTPATIENT)
Dept: UROLOGY | Facility: CLINIC | Age: 38
End: 2023-11-10
Payer: COMMERCIAL

## 2023-11-10 NOTE — TELEPHONE ENCOUNTER
She needs to call her OB/GYN urge incontinence can happen with menses but if she's bleeding that much she needs to see them and discuss with them.  Has had urge incontinence for a while that we discussed medication and pelvic floor physical therapy for none of which she would agree to and has not followed up with.

## 2023-11-10 NOTE — TELEPHONE ENCOUNTER
PATIENT CALLED AND STATED THAT SHE IS HAVING HER FIRST PERIOD, SINCE [facility] CALLED AND TALKED TO HER, TRYING TO FIGURE OUT WHAT IS GOING ON WITH HER.    SHE SAID SHE HAS BEEN HAVING SOME INCONTINENCE ISSUES.  THIS MORNING, SHE DIDN'T MAKE IT TO THE TOILET.  PEE HIT THE TOILET, BUT BLOOD HIT THE FLOOR.  SHE SAID SOME CLOTS CAME OUT.  SHE HAS PASSED SOME CLOTS ON HER PAD.  SHE SAID THE BLOOD WAS OOZING OUT.  I ASKED IF BLOOD WAS COMING FROM VAGINA OR URETHRA.  SHE SAID SHE IS ASSUMING IT IS VAGINAL, BECAUSE THE URINE HIT THE TOILET AND THE BLOOD HIT THE FLOOR.    SHE FEELS LIKE THIS ISN'T NORMAL.  SHE ASKED IF [facility] WILL CALL HER BACK.

## 2024-11-05 ENCOUNTER — TELEPHONE (OUTPATIENT)
Dept: UROLOGY | Age: 39
End: 2024-11-05
Payer: COMMERCIAL

## 2024-11-05 NOTE — TELEPHONE ENCOUNTER
I CALLED THE PATIENT AND TOLD HER, PER [facility]:  have not seen this patient in over 3 years In office.  Dr. Paz has not seen the patient in over a year therefore we have not been managing her care.  She needs to take the medication as prescribed by the provider that prescribed to her.  I am not going to speak to the patient on the telephone as she is not established with me currently we can refer her to the scheduling department and she can be scheduled next available.     I TRANSFERRED HER TO [facility] AND SHE SCHEDULED AN APPOINTMENT FOR HER.

## 2024-11-05 NOTE — TELEPHONE ENCOUNTER
PATIENT CALLED AND SAID SHE WENT TO [facility] SUNDAY BEFORE LAST TO THE ER.  SHE FELT LIKE SHE WAS HAVING CONTRACTIONS, BUT SAID THERE WAS NO CHANCE OF PREGNANCY.  SHE WAS HAVING A HEAVY PERIOD.      SHE SAID [facility] TOLD HER SHE HAD A UTI, AND PRESCRIBED KEFLEX 500 MG TID.    PATIENT FOLLOWED UP WITH HER GYNECOLOGIST, JAYLEEN KUMAR.  SHE SAID THEY CULTURE HER URINE AND TOLD HER IT WASN'T A UTI, AND THAT SHE HAS A BACTERIA THAT CAN GET IN THE LUNGS AND BLOOD STREAM.  THEY TOLD HER SHE NEEDS TO TAKE CIPRO 200 MG TWICE A DAY FOR 3 DAYS. SHE HASN'T PICKED IT UP YET.  THIS HAS HER WORRIED.     PATIENT SAID SHE HAS MOLD IN HER APARTMENT, SO SHE HAS BEEN SLEEPING IN HER CAR.  SHE WANTS TO KNOW IF BEING UNABLE TO SHOWER REGULARLY COULD CAUSE THE BACTERIA OR CAUSE INACCURATE RESULTS ON URINE CULTURE.    PATIENT SAID [facility] TOLD HER SHE HAS BEEN ON TOO MANY ANTIBIOTICS, CAUSING HER TO BE RESISTANT TO SOME.  SHE IS AFRAID TO TAKE WHAT WAS PRESCRIBED AND AFRAID NOT TO TAKE IT.      SHE WANTS TO TALK TO [facility] TO EXPLAIN EVERYTHING THAT IS GOING ON, AND TO SEE HER ASAP, PROBABLY IN [place].    #[phone]

## 2024-12-06 ENCOUNTER — TELEPHONE (OUTPATIENT)
Dept: UROLOGY | Age: 39
End: 2024-12-06
Payer: COMMERCIAL

## 2024-12-06 NOTE — TELEPHONE ENCOUNTER
Called patient and patient voicemail box is full. Patient is scheduled to see [facility] on Monday the 9th. Calling patient to discuss if she was till having problems/symptoms. If patient is having any we can keep her appointment but if patients symptoms have resolved we can push her appointment out.     If patient calls back HUB OKAY TO RELAY THIS MESSAGE.